# Patient Record
Sex: FEMALE | Race: BLACK OR AFRICAN AMERICAN | Employment: FULL TIME | URBAN - METROPOLITAN AREA
[De-identification: names, ages, dates, MRNs, and addresses within clinical notes are randomized per-mention and may not be internally consistent; named-entity substitution may affect disease eponyms.]

---

## 2017-02-03 ENCOUNTER — OFFICE VISIT (OUTPATIENT)
Dept: SURGERY | Age: 52
End: 2017-02-03

## 2017-02-03 ENCOUNTER — HOSPITAL ENCOUNTER (OUTPATIENT)
Dept: LAB | Age: 52
Discharge: HOME | End: 2017-02-03
Payer: COMMERCIAL

## 2017-02-03 VITALS
DIASTOLIC BLOOD PRESSURE: 85 MMHG | TEMPERATURE: 97.1 F | SYSTOLIC BLOOD PRESSURE: 150 MMHG | HEART RATE: 60 BPM | RESPIRATION RATE: 20 BRPM | HEIGHT: 61 IN | BODY MASS INDEX: 40.78 KG/M2 | WEIGHT: 216 LBS

## 2017-02-03 DIAGNOSIS — K91.2 POSTOPERATIVE MALABSORPTION: Primary | ICD-10-CM

## 2017-02-03 DIAGNOSIS — K90.9 INTESTINAL MALABSORPTION, UNSPECIFIED TYPE: ICD-10-CM

## 2017-02-03 LAB
25(OH)D3 SERPL-MCNC: 35.4 NG/ML (ref 30–100)
ALBUMIN SERPL BCP-MCNC: 3.9 G/DL (ref 3.4–5)
ALBUMIN/GLOB SERPL: 1.1 {RATIO} (ref 0.8–1.7)
ALP SERPL-CCNC: 83 U/L (ref 45–117)
ALT SERPL-CCNC: 27 U/L (ref 13–56)
ANION GAP BLD CALC-SCNC: 8 MMOL/L (ref 3–18)
AST SERPL W P-5'-P-CCNC: 19 U/L (ref 15–37)
BASOPHILS # BLD AUTO: 0 K/UL (ref 0–0.06)
BASOPHILS # BLD: 0 % (ref 0–2)
BILIRUB SERPL-MCNC: 0.4 MG/DL (ref 0.2–1)
BUN SERPL-MCNC: 12 MG/DL (ref 7–18)
BUN/CREAT SERPL: 18 (ref 12–20)
CALCIUM SERPL-MCNC: 9.7 MG/DL (ref 8.5–10.1)
CHLORIDE SERPL-SCNC: 102 MMOL/L (ref 100–108)
CO2 SERPL-SCNC: 31 MMOL/L (ref 21–32)
CREAT SERPL-MCNC: 0.65 MG/DL (ref 0.6–1.3)
DIFFERENTIAL METHOD BLD: ABNORMAL
EOSINOPHIL # BLD: 0.2 K/UL (ref 0–0.4)
EOSINOPHIL NFR BLD: 3 % (ref 0–5)
ERYTHROCYTE [DISTWIDTH] IN BLOOD BY AUTOMATED COUNT: 15.5 % (ref 11.6–14.5)
FERRITIN SERPL-MCNC: 49 NG/ML (ref 8–388)
FOLATE SERPL-MCNC: >20 NG/ML (ref 3.1–17.5)
GLOBULIN SER CALC-MCNC: 3.4 G/DL (ref 2–4)
GLUCOSE SERPL-MCNC: 95 MG/DL (ref 74–99)
HCT VFR BLD AUTO: 38.6 % (ref 35–45)
HGB BLD-MCNC: 12.6 G/DL (ref 12–16)
IRON SERPL-MCNC: 113 UG/DL (ref 50–175)
LYMPHOCYTES # BLD AUTO: 52 % (ref 21–52)
LYMPHOCYTES # BLD: 2.7 K/UL (ref 0.9–3.6)
MCH RBC QN AUTO: 30.4 PG (ref 24–34)
MCHC RBC AUTO-ENTMCNC: 32.6 G/DL (ref 31–37)
MCV RBC AUTO: 93.2 FL (ref 74–97)
MONOCYTES # BLD: 0.3 K/UL (ref 0.05–1.2)
MONOCYTES NFR BLD AUTO: 6 % (ref 3–10)
NEUTS SEG # BLD: 2 K/UL (ref 1.8–8)
NEUTS SEG NFR BLD AUTO: 39 % (ref 40–73)
PLATELET # BLD AUTO: 281 K/UL (ref 135–420)
PMV BLD AUTO: 11.7 FL (ref 9.2–11.8)
POTASSIUM SERPL-SCNC: 4.3 MMOL/L (ref 3.5–5.5)
PROT SERPL-MCNC: 7.3 G/DL (ref 6.4–8.2)
RBC # BLD AUTO: 4.14 M/UL (ref 4.2–5.3)
SODIUM SERPL-SCNC: 141 MMOL/L (ref 136–145)
VIT B12 SERPL-MCNC: 543 PG/ML (ref 211–911)
WBC # BLD AUTO: 5.1 K/UL (ref 4.6–13.2)

## 2017-02-03 PROCEDURE — 36415 COLL VENOUS BLD VENIPUNCTURE: CPT | Performed by: SURGERY

## 2017-02-03 PROCEDURE — 82306 VITAMIN D 25 HYDROXY: CPT | Performed by: SURGERY

## 2017-02-03 PROCEDURE — 84425 ASSAY OF VITAMIN B-1: CPT | Performed by: SURGERY

## 2017-02-03 PROCEDURE — 82728 ASSAY OF FERRITIN: CPT | Performed by: SURGERY

## 2017-02-03 PROCEDURE — 83540 ASSAY OF IRON: CPT | Performed by: SURGERY

## 2017-02-03 PROCEDURE — 80053 COMPREHEN METABOLIC PANEL: CPT | Performed by: SURGERY

## 2017-02-03 PROCEDURE — 85025 COMPLETE CBC W/AUTO DIFF WBC: CPT | Performed by: SURGERY

## 2017-02-03 PROCEDURE — 82607 VITAMIN B-12: CPT | Performed by: SURGERY

## 2017-02-03 RX ORDER — BISMUTH SUBSALICYLATE 262 MG/15ML
30 LIQUID ORAL
COMMUNITY
End: 2019-03-18

## 2017-02-03 RX ORDER — POLYETHYLENE GLYCOL 3350 17 G/17G
17 POWDER, FOR SOLUTION ORAL DAILY
COMMUNITY
End: 2019-03-18

## 2017-02-03 RX ORDER — ATORVASTATIN CALCIUM 10 MG/1
TABLET, FILM COATED ORAL DAILY
COMMUNITY

## 2017-02-03 NOTE — PROGRESS NOTES
46year old female presents for her 3 months post-op bypass apt. Reports that she has some heartburn and constipation has been taking Pepto PRN  and Miramax daily. States she is drinking 78 oz of fluid daily is drinking water and the protein shakes. Is eating fish, shrimp, turkey and chicken. Reports that she is exercising 30 mins daily. Body mass index is 40.81 kg/(m^2).

## 2017-02-03 NOTE — MR AVS SNAPSHOT
Visit Information Date & Time Provider Department Dept. Phone Encounter #  
 2/3/2017  9:30 AM MD [facility] [phone] [id number] Upcoming Health Maintenance Date Due Hepatitis C Screening 1965 DTaP/Tdap/Td series (1 - Tdap) 1/11/1986 PAP AKA CERVICAL CYTOLOGY 1/11/1986 BREAST CANCER SCRN MAMMOGRAM 1/11/2015 FOBT Q 1 YEAR AGE 50-75 1/11/2015 INFLUENZA AGE 9 TO ADULT 8/1/2016 Allergies as of 2/3/2017  Review Complete On: 2/3/2017 By: Claritza Carpio LPN Severity Noted Reaction Type Reactions Shellfish Derived  03/30/2016    Rash Current Immunizations  Never Reviewed No immunizations on file. Not reviewed this visit You Were Diagnosed With   
  
 Codes Comments Postoperative malabsorption    -  Primary ICD-10-CM: K91.2 ICD-9-CM: 579.3 Vitals BP Pulse Temp Resp Height(growth percentile) Weight(growth percentile) 150/85 60 97.1 °F (36.2 °C) (Oral) 20 5' 1\" (1.549 m) 216 lb (98 kg) BMI OB Status Smoking Status 40.81 kg/m2 Hysterectomy Never Smoker Vitals History BMI and BSA Data Body Mass Index Body Surface Area  
 40.81 kg/m 2 2.05 m 2 Preferred Pharmacy Pharmacy Name Phone [facility] [address] [facility], [address] [place] [address] [address] Your Updated Medication List  
  
   
This list is accurate as of: 2/3/17 10:54 AM.  Always use your most recent med list.  
  
  
  
  
 atenolol 25 mg tablet Commonly known as:  TENORMIN Take 75 mg by mouth daily. bismuth subsalicylate 418 CARMELLA/48 mL suspension Commonly known as:  PEPTO-BISMOL Take 30 mL by mouth every six (6) hours as needed for Indigestion. LIPITOR 10 mg tablet Generic drug:  atorvastatin Take  by mouth daily. MIRALAX 17 gram/dose powder Generic drug:  polyethylene glycol Take 17 g by mouth daily. potassium chloride SR 10 mEq tablet Commonly known as:  KLOR-CON 10 Take 10 mEq by mouth daily. Introducing [facility]! Jo-Ann Hendrickson introduces [employer] patient portal. Now you can access parts of your medical record, email your doctor's office, and request medication refills online. 1. In your internet browser, go to [email or web address] 2. Click on the First Time User? Click Here link in the Sign In box. You will see the New Member Sign Up page. 3. Enter your [employer] Access Code exactly as it appears below. You will not need to use this code after youve completed the sign-up process. If you do not sign up before the expiration date, you must request a new code. · [employer] Access Code: [email or web address] Expires: 5/4/2017  9:55 AM 
 
4. Enter the last four digits of your Social Security Number (xxxx) and Date of Birth (mm/dd/yyyy) as indicated and click Submit. You will be taken to the next sign-up page. 5. Create a CloudHashing ID. This will be your [employer]ashing login ID and cannot be changed, so think of one that is secure and easy to remember. 6. Create a [employer] password. You can change your password at any time. 7. Enter your Password Reset Question and Answer. This can be used at a later time if you forget your password. 8. Enter your e-mail address. You will receive e-mail notification when new information is available in [address] 9. Click Sign Up. You can now view and download portions of your medical record. 10. Click the Download Summary menu link to download a portable copy of your medical information. If you have questions, please visit the Frequently Asked Questions section of the [employer] website. Remember, [employer] is NOT to be used for urgent needs. For medical emergencies, dial 911. Now available from your iPhone and Android! Please provide this summary of care documentation to your next provider. Your primary care clinician is listed as [facility]. If you have any questions after today's visit, please call [phone].

## 2017-02-03 NOTE — PROGRESS NOTES
Subjective: Jennifer Maynard is a 46 y.o. female is now 3 months status post laparoscopic gastric bypass surgery. Doing well overall. Currently on a stage 4 diet without difficulty. Taking in 75oz water daily. Sources of protein include supplements, baked chicken, turkey, shrimp. She is walking daily and going to [employer]. 30 min of activity 7 days a week. She has been taken off of all her diabetic medications. Bowel movements are alternating constipation and regularity. She notes that she takes Miralax daily and has 3BM daily. If she doesn't take it, she has one hard BM daily. She feels better on the miralax. The patient is not having any pain. . The patient is compliant with multivitamins, calcium, Vit D and B12 supplements. Weight Loss Metrics 2/3/2017 10/19/2016 10/13/2016 10/13/2016 10/5/2016 9/30/2016 9/16/2016   Pre op / Initial Wt - 273 273 - - - -   Today's Wt 216 lb 254 lb - 261 lb - 276 lb 273 lb   BMI 40.81 kg/m2 47.99 kg/m2 - 49.32 kg/m2 52.15 kg/m2 - 51.58 kg/m2   Ideal Body Wt - 126 122 - - - -   Excess Body Wt - 147 151 - - - -   Goal Wt - 155 150 - - - -   Wt loss to date - 19 12 - - - -   % Wt Loss - 0.16 0.1 - - - -   80% EBW - 117.6 120.8 - - - -       Body mass index is 40.81 kg/(m^2).     Comorbidities:    Hypertension: resolved  Diabetes: resolved  Obstructive Sleep Apnea: not applicable  Hyperlipidemia: unchanged  Stress Urinary Incontinence: improved  Gastroesophageal Reflux: not applicable  Weight related arthropathy:improved        Past Medical History   Diagnosis Date    Diabetes ([address]     Hyperlipidemia     Hyperlipidemia     Hypertension     Morbid obesity ([address]        Past Surgical History   Procedure Laterality Date    Hx gyn  2007     partial hysterectomy    Debridement Right 12/12/2012     incision & debridement of right lower leg wound    Hx hernia repair  2012     incisional & umbilical    Hx hernia repair  2012     VENTRAL WITH MESH Current Outpatient Prescriptions   Medication Sig Dispense Refill    polyethylene glycol (MIRALAX) 17 gram/dose powder Take 17 g by mouth daily.  bismuth subsalicylate (PEPTO-BISMOL) 262 mg/15 mL suspension Take 30 mL by mouth every six (6) hours as needed for Indigestion.  atorvastatin (LIPITOR) 10 mg tablet Take  by mouth daily.  atenolol (TENORMIN) 25 mg tablet Take 75 mg by mouth daily.  Blood-Glucose Meter monitoring kit Check sugars QID 1 Kit 0    insulin regular 100 unit/mL soln injection by SubCUTAneous route Before breakfast, lunch, dinner and at bedtime. If sugars are below 150 do nothing     If glucose is:   150-200 . ... 2 units   200-250 . Garcia Colon .. 4 units   250-300 . Garcia Colon .. 6 units   300-350 . Garcia Colon .. 8 units  350-400 . Garcia Colon .. 10 units  If glucose is above 400, call doctor. 1 Vial 0    potassium chloride SR (KLOR-CON 10) 10 mEq tablet Take 10 mEq by mouth daily. Allergies   Allergen Reactions    Shellfish Derived Rash         Objective:     Visit Vitals    /85    Pulse 60    Temp 97.1 °F (36.2 °C) (Oral)    Resp 20    Ht 5' 1\" (1.549 m)    Wt 98 kg (216 lb)    BMI 40.81 kg/m2       General:  alert, cooperative, no distress, appears stated age   Chest: lungs clear to auscultation, no accessory muscle use   Cor:   Regular rate and rhythm   Abdomen: soft, bowel sounds active, non-tender   Incisions:   healing well, no drainage, no erythema, no hernia, no seroma, no swelling, no dehiscence, incision well approximated       Labs: not done    Assessment:     Doing well postoperatively. Have directed to take Miralax as needed for constipation, but does not need to have 3 Bm daily. She can also decrease her reliance on protein supplements.     Plan:     Increase activity to the goal of 30 minutes daily, Follow up labs as ordered, Increase fluids, Follow up with Registered Dietician and Continue MVI/Ca/B12 supplementation  Encouraged to attend support group  Follow up in 3 months

## 2017-02-07 LAB — VIT B1 BLD-SCNC: 130.4 NMOL/L (ref 66.5–200)

## 2017-11-13 ENCOUNTER — DOCUMENTATION ONLY (OUTPATIENT)
Dept: BARIATRICS/WEIGHT MGMT | Age: 52
End: 2017-11-13

## 2017-11-13 NOTE — PROGRESS NOTES
Per [employer] requirements:  Letter sent requesting follow up appointment. Susan Keyes [address]      Dear Joe Badillo,  Your health is our main concern. It is important for your health to have follow-up lab work and to see you surgeon at 3 months, 6 months and annually after your weight loss surgery. Additionally, the Department of bariatric Surgery at our hospital is a member of the [employer] [address] Surgical Quality Improvement Program ([facility] NSQIP). As a participant in this program, we gather information on the outcomes of our patients after surgery. Please call the office for a follow up appointment at [phone] with NAOMIE Hargrove. If you have moved out of the area or have changed surgeons please call us and let us know the name of your doctor. Your health and feedback are important to us. We greatly appreciate your response.        Thank you,  Susan Hendrickson Loss [address]

## 2018-01-18 ENCOUNTER — TELEPHONE (OUTPATIENT)
Dept: SURGERY | Age: 53
End: 2018-01-18

## 2018-01-18 NOTE — TELEPHONE ENCOUNTER
[facility]; Phone call placed. Spoke with patient. She states that Dr. Ya Majano told her that she does not need to come in for follow up appointments anymore. I encouraged patient to be seen annually. Patient states that when she gets her new insurance she will call to schedule an appointment.

## 2018-09-26 ENCOUNTER — DOCUMENTATION ONLY (OUTPATIENT)
Dept: SURGERY | Age: 53
End: 2018-09-26

## 2018-09-26 NOTE — LETTER
[employer] [facility] Dear Patient, Your health is our main concern. It is important for your health to have follow-up lab work and to see you surgeon at 3 months, 6 months and annually after your weight loss surgery. Additionally, the Department of Bariatric Surgery at our hospital is a member of the [employer] of [address] [place] [address] Surgical Quality Improvement Program ([facility] NSQIP). As a participant in this program, we gather information on the outcomes of our patients after surgery. Please call the office for a follow up appointment at [phone] with DONA Hartley. If you have moved out of the area or have changed surgeons please call us and let us know the name of your doctor. Your health and feedback are important to us. We greatly appreciate your response. Thank you, [facility] Loss [address]

## 2019-01-15 ENCOUNTER — TELEPHONE (OUTPATIENT)
Dept: SURGERY | Age: 54
End: 2019-01-15

## 2019-03-18 ENCOUNTER — OFFICE VISIT (OUTPATIENT)
Dept: SURGERY | Age: 54
End: 2019-03-18

## 2019-03-18 VITALS
HEART RATE: 55 BPM | WEIGHT: 224 LBS | TEMPERATURE: 97.4 F | BODY MASS INDEX: 42.29 KG/M2 | HEIGHT: 61 IN | SYSTOLIC BLOOD PRESSURE: 178 MMHG | RESPIRATION RATE: 20 BRPM | DIASTOLIC BLOOD PRESSURE: 85 MMHG

## 2019-03-18 DIAGNOSIS — K91.2 POSTOPERATIVE MALABSORPTION: Primary | ICD-10-CM

## 2019-03-18 DIAGNOSIS — E55.9 HYPOVITAMINOSIS D: ICD-10-CM

## 2019-03-18 DIAGNOSIS — R10.33 PERIUMBILICAL ABDOMINAL PAIN: ICD-10-CM

## 2019-03-18 RX ORDER — FUROSEMIDE 20 MG/1
TABLET ORAL
COMMUNITY
Start: 2018-09-27

## 2019-03-18 RX ORDER — MUPIROCIN 20 MG/G
OINTMENT TOPICAL
COMMUNITY
Start: 2019-03-06 | End: 2019-03-18

## 2019-03-18 RX ORDER — POLYETHYLENE GLYCOL 3350 17 G/17G
POWDER, FOR SOLUTION ORAL
COMMUNITY
End: 2019-03-18

## 2019-03-18 RX ORDER — OMEPRAZOLE 40 MG/1
40 CAPSULE, DELAYED RELEASE ORAL
COMMUNITY
Start: 2018-09-27 | End: 2019-03-18

## 2019-03-18 RX ORDER — TRIAMCINOLONE ACETONIDE 1 MG/G
CREAM TOPICAL
COMMUNITY
Start: 2016-01-26 | End: 2019-03-18

## 2019-03-18 RX ORDER — ASPIRIN 81 MG/1
81 TABLET ORAL
COMMUNITY
End: 2019-03-18

## 2019-03-18 RX ORDER — CEPHALEXIN 500 MG/1
500 CAPSULE ORAL
COMMUNITY
Start: 2018-12-06 | End: 2019-03-18

## 2019-03-18 NOTE — PATIENT INSTRUCTIONS
If you have any questions or concerns about today's appointment, the verbal and/or written instructions you were given for follow up care, please call our office at [phone].     [facility] - [address], [address] [place] [address]    [phone] office  [phone] fax    CT of Abdomen: Sunday, March 24, 2019 at 3:00pm check in at 2:30pm in [address] at [address] ID, List of Current Medications, Do not Eat or Drink 4 hours prior to scheduled procedure      Get Labs Done at [address]:  Multi-Vitamins, Calcium, B 12

## 2019-03-18 NOTE — PROGRESS NOTES
Allie Laird is a 47 y.o. female who presents today with   Chief Complaint   Patient presents with    Hernia (Non Specific)     Pt c/o abdominal pain, nausea and constipation. She report a buldge in Epigastric region. She is here for surgical intervention. 1. Have you been to the ER, urgent care clinic since your last visit? Hospitalized since your last visit? no    2. Have you seen or consulted any other health care providers outside of the [address] [place] [address] since your last visit? Include any pap smears or colon screening.  no

## 2019-03-18 NOTE — PROGRESS NOTES
Subjective: Sedrick Stevens is a 47 y.o. female is now 2.5 years status post laparoscopic gastric bypass surgery. Doing well overall. Currently on a stage 4 diet without difficulty. Taking in 64oz water,  60g protein. She is sedentary. Bowel movements are constipated. She complains of a new umbilical bulge. She had a lap umbilical and lower midline ventral hernia repair in 2012. .  The patient is not compliant with multivitamins, calcium and B12 supplements. She last saw us 2 years ago. She has gained 25 lbs since then. Weight Loss Metrics 3/18/2019 2/3/2017 10/19/2016 10/13/2016 10/13/2016 10/5/2016 9/30/2016   Pre op / Initial Wt - - 273 273 - - -   Today's Wt 224 lb 216 lb 254 lb - 261 lb - 276 lb   BMI 42.32 kg/m2 40.81 kg/m2 47.99 kg/m2 - 49.32 kg/m2 52.15 kg/m2 -   Ideal Body Wt - - 126 122 - - -   Excess Body Wt - - 147 151 - - -   Goal Wt - - 155 150 - - -   Wt loss to date - - 19 12 - - -   % Wt Loss - - 0.16 0.1 - - -   80% EBW - - 117.6 120.8 - - -       Body mass index is 42.32 kg/m².     Comorbidities:    Hypertension: improved  Diabetes: resolved  Obstructive Sleep Apnea: not applicable  Hyperlipidemia: improved  Stress Urinary Incontinence: not applicable  Gastroesophageal Reflux: not applicable  Weight related arthropathy:improved        Past Medical History:   Diagnosis Date    [facility]     Hyperlipidemia     Hyperlipidemia     Hypertension     Morbid obesity ([facility]        Past Surgical History:   Procedure Laterality Date    DEBRIDEMENT Right 12/12/2012    incision & debridement of right lower leg wound    HX GYN  2007    partial hysterectomy    HX HERNIA REPAIR  2012    incisional & umbilical    HX HERNIA REPAIR  2012    VENTRAL WITH MESH    HX HERNIA REPAIR  2016    Hernia repair with mesh       Current Outpatient Medications   Medication Sig Dispense Refill    furosemide (LASIX) 20 mg tablet TAKE ONE TABLET BY MOUTH ONCE DAILY      atorvastatin (LIPITOR) 10 mg tablet Take  by mouth daily.  atenolol (TENORMIN) 25 mg tablet Take 75 mg by mouth daily. Allergies   Allergen Reactions    Shellfish Derived Rash         Objective:     Visit Vitals  /85 (BP 1 Location: Left arm, BP Patient Position: At rest)   Pulse (!) 55   Temp 97.4 °F (36.3 °C) (Oral)   Resp 20   Ht 5' 1\" (1.549 m)   Wt 101.6 kg (224 lb)   BMI 42.32 kg/m²       General:  alert, cooperative, no distress, appears stated age   Chest: no accessory muscle use   Cor:   Regular rate and rhythm   Abdomen:  multiple lap and lower midline scars. Non-pulsing suprauumbiliical bulgine without reduceable component. Incision:   all fully healed        Labs: none    Assessment:     Doing well postoperatively.   Poor weight loss and compliance    I do not think this is a hernia    Plan:     Increase activity to the goal of 30 minutes daily, Follow up labs as ordered, Increase fluids, Follow up with Registered Dietician and Continue MVI/Ca/B12 supplementation  Follow up in 2 weeks after a CT to look at this bulge and see if there is an actual hernia

## 2019-04-01 ENCOUNTER — OFFICE VISIT (OUTPATIENT)
Dept: SURGERY | Age: 54
End: 2019-04-01

## 2019-04-01 VITALS
DIASTOLIC BLOOD PRESSURE: 74 MMHG | TEMPERATURE: 95.2 F | BODY MASS INDEX: 42.86 KG/M2 | WEIGHT: 227 LBS | HEIGHT: 61 IN | HEART RATE: 60 BPM | RESPIRATION RATE: 20 BRPM | SYSTOLIC BLOOD PRESSURE: 153 MMHG

## 2019-04-01 DIAGNOSIS — K91.2 POSTOPERATIVE MALABSORPTION: Primary | ICD-10-CM

## 2019-04-01 RX ORDER — PANTOPRAZOLE SODIUM 40 MG/1
40 TABLET, DELAYED RELEASE ORAL DAILY
Qty: 90 TAB | Refills: 1 | Status: SHIPPED | OUTPATIENT
Start: 2019-04-01

## 2019-04-01 RX ORDER — SUCRALFATE 1 G/1
1 TABLET ORAL 4 TIMES DAILY
Qty: 120 TAB | Refills: 1 | Status: SHIPPED | OUTPATIENT
Start: 2019-04-01

## 2019-04-01 NOTE — PATIENT INSTRUCTIONS
If you have any questions or concerns about today's appointment, the verbal and/or written instructions you were given for follow up care, please call our office at [phone].     [facility] - [address]    [phone] office  [phone]

## 2019-04-01 NOTE — PROGRESS NOTES
Olivia Gutierrez is a 47 y.o. female who presents today with   Chief Complaint   Patient presents with    Hernia (Non Specific)     Pt presents today for evaluation of hernia. CT ABD with contrast 3/25/2019                1. Have you been to the ER, urgent care clinic since your last visit? Hospitalized since your last visit? No    2. Have you seen or consulted any other health care providers outside of the [address] [place] [address] since your last visit? Include any pap smears or colon screening.  No

## 2019-04-09 ENCOUNTER — DOCUMENTATION ONLY (OUTPATIENT)
Dept: SURGERY | Age: 54
End: 2019-04-09

## 2019-04-09 NOTE — PROGRESS NOTES
Subjective: Nikita Jacmkan is a 47 y.o. female is now 2.5 years status post laparoscopic gastric bypass surgery with Dr. Mikayla Mello. She returns today after CT obtained to look for hernia at possible periumbilical position. She complains of pain with eating fruit and starches. Weight Loss Metrics 4/1/2019 3/18/2019 2/3/2017 10/19/2016 10/13/2016 10/13/2016 10/5/2016   Pre op / Initial Wt - - - 273 273 - -   Today's Wt 227 lb 224 lb 216 lb 254 lb - 261 lb -   BMI 42.89 kg/m2 42.32 kg/m2 40.81 kg/m2 47.99 kg/m2 - 49.32 kg/m2 52.15 kg/m2   Ideal Body Wt - - - 126 122 - -   Excess Body Wt - - - 147 151 - -   Goal Wt - - - 155 150 - -   Wt loss to date - - - 19 12 - -   % Wt Loss - - - 0.16 0.1 - -   80% EBW - - - 117.6 120.8 - -       Body mass index is 42.89 kg/m². Comorbidities:    Hypertension: improved  Diabetes: improved  Obstructive Sleep Apnea: not applicable  Hyperlipidemia: improved  Stress Urinary Incontinence: not applicable  Gastroesophageal Reflux: not applicable  Weight related arthropathy:not applicable        Past Medical History:   Diagnosis Date    [facility]     Hyperlipidemia     Hyperlipidemia     Hypertension     Morbid obesity ([facility]        Past Surgical History:   Procedure Laterality Date    DEBRIDEMENT Right 12/12/2012    incision & debridement of right lower leg wound    HX GYN  2007    partial hysterectomy    HX HERNIA REPAIR  2012    incisional & umbilical    HX HERNIA REPAIR  2012    VENTRAL WITH MESH    HX HERNIA REPAIR  2016    Hernia repair with mesh       Current Outpatient Medications   Medication Sig Dispense Refill    pantoprazole (PROTONIX) 40 mg tablet Take 1 Tab by mouth daily. 90 Tab 1    sucralfate (CARAFATE) 1 gram tablet Take 1 Tab by mouth four (4) times daily. Indications: stomach ulcer 120 Tab 1    furosemide (LASIX) 20 mg tablet TAKE ONE TABLET BY MOUTH ONCE DAILY      atorvastatin (LIPITOR) 10 mg tablet Take  by mouth daily.       atenolol (TENORMIN) 25 mg tablet Take 75 mg by mouth daily. Allergies   Allergen Reactions    Shellfish Derived Rash         Objective:     Visit Vitals  /74 (BP 1 Location: Left arm, BP Patient Position: At rest)   Pulse 60   Temp 95.2 °F (35.1 °C) (Oral)   Resp 20   Ht 5' 1\" (1.549 m)   Wt 103 kg (227 lb)   BMI 42.89 kg/m²       General:  alert, cooperative, no distress, appears stated age   Chest: no accessory muscle use   Cor:   Regular rate and rhythm   Abdomen: soft, bowel sounds active, non-tender, no hernias   Incision:   healed       CT - at [facility] -   No discrete CT abnormality identified to explain reported symptoms of palpable abdominal wall mass. Evidence of prior hernia mesh repair which appears intact/unchanged. Assessment:     No hernia - symptoms are best explained by dietary indiscretions. Explained again that her starch and sugar consumption will cause these symptoms.      Plan:     Follow up with Registered Dietician for dietary education again  Follow up in 1 years

## 2019-09-13 ENCOUNTER — DOCUMENTATION ONLY (OUTPATIENT)
Dept: SURGERY | Age: 54
End: 2019-09-13

## 2019-09-13 NOTE — PROGRESS NOTES
Per [facility] requirements;  E-mail and letter sent for follow up appointment. [facility] [place]   [facility]  [facility]        Dear Patient,        Your health is our main concern. It is important for your health to have follow-up lab work and to see you surgeon at 3 months, 6 months and annually after your weight loss surgery. Additionally, the Department of Bariatric Surgery at our hospital is a member of the [employer] [address] Surgical Quality Improvement Program ([facility] NSQIP). As a participant in this program, we gather information on the outcomes of our patients after surgery. Please call the office for a follow up appointment at [phone]. If you have moved out of the area or have changed surgeons please call us and let us know the name of your doctor. Your health and feedback are important to us. We greatly appreciate your response.        Thank you,  [facility] [address] [facility]

## 2019-09-13 NOTE — LETTER
[employer] [facility] Loss [facility]/HOSPITAL [facility] Dear Patient, Your health is our main concern. It is important for your health to have follow-up lab work and to see you surgeon at 3 months, 6 months and annually after your weight loss surgery. Additionally, the Department of Bariatric Surgery at our hospital is a member of the [employer] of [address] [place] [address] Surgical Quality Improvement Program ([facility] NSQIP). As a participant in this program, we gather information on the outcomes of our patients after surgery. Please call the office for a follow up appointment at [phone]. If you have moved out of the area or have changed surgeons please call us and let us know the name of your doctor. Your health and feedback are important to us. We greatly appreciate your response. Thank you, [facility] [place]

## 2025-07-21 DIAGNOSIS — K91.2 POSTOPERATIVE MALABSORPTION: Primary | ICD-10-CM

## 2025-08-05 ENCOUNTER — TELEPHONE (OUTPATIENT)
Age: 60
End: 2025-08-05

## 2025-08-05 LAB
A/G RATIO: 1.1 RATIO (ref 1.1–2.6)
ALBUMIN: 4.1 G/DL (ref 3.5–5)
ALP BLD-CCNC: 174 U/L (ref 40–120)
ALT SERPL-CCNC: 9 U/L (ref 5–40)
ANION GAP SERPL CALCULATED.3IONS-SCNC: 16 MMOL/L (ref 3–15)
AST SERPL-CCNC: 20 U/L (ref 10–37)
BASOPHILS # BLD: 1 % (ref 0–2)
BASOPHILS ABSOLUTE: 0 K/UL (ref 0–0.2)
BILIRUB SERPL-MCNC: 0.2 MG/DL (ref 0.2–1.2)
BUN BLDV-MCNC: 9 MG/DL (ref 6–22)
CALCIUM SERPL-MCNC: 9.4 MG/DL (ref 8.4–10.5)
CHLORIDE BLD-SCNC: 103 MMOL/L (ref 98–110)
CO2: 25 MMOL/L (ref 20–32)
CREAT SERPL-MCNC: 0.6 MG/DL (ref 0.8–1.4)
EOSINOPHIL # BLD: 3 % (ref 0–6)
EOSINOPHILS ABSOLUTE: 0.2 K/UL (ref 0–0.5)
FERRITIN: 10 NG/ML (ref 10–291)
FOLATE: 14.5 NG/ML
GFR, ESTIMATED: >90 ML/MIN/1.73 SQ.M.
GLOBULIN: 3.7 G/DL (ref 2–4)
GLUCOSE: 74 MG/DL (ref 70–99)
HCT VFR BLD CALC: 35.3 % (ref 35.1–48)
HEMOGLOBIN: 10.3 G/DL (ref 11.7–16)
LYMPHOCYTES # BLD: 27 % (ref 20–45)
LYMPHOCYTES ABSOLUTE: 1.8 K/UL (ref 1–4.8)
MCH RBC QN AUTO: 25 PG (ref 26–34)
MCHC RBC AUTO-ENTMCNC: 29 G/DL (ref 31–36)
MCV RBC AUTO: 86 FL (ref 80–99)
MONOCYTES ABSOLUTE: 0.3 K/UL (ref 0.1–1)
MONOCYTES: 5 % (ref 3–12)
NEUTROPHILS ABSOLUTE: 4.1 K/UL (ref 1.8–7.7)
NEUTROPHILS SEGMENTED: 65 % (ref 40–75)
PDW BLD-RTO: 17.1 % (ref 10–15.5)
PLATELET # BLD: 466 K/UL (ref 140–440)
PMV BLD AUTO: 10.2 FL (ref 9–13)
POTASSIUM SERPL-SCNC: 4 MMOL/L (ref 3.5–5.5)
RBC # BLD: 4.11 M/UL (ref 3.8–5.2)
SODIUM BLD-SCNC: 144 MMOL/L (ref 133–145)
TOTAL PROTEIN: 7.8 G/DL (ref 6.2–8.1)
VITAMIN B-12: 593 PG/ML (ref 211–911)
VITAMIN D 25-HYDROXY: 61.9 NG/ML (ref 32–100)
WBC # BLD: 6.4 K/UL (ref 4–11)

## 2025-08-06 LAB — IRON: 34 MCG/DL (ref 30–160)

## 2025-08-11 LAB — THIAMINE BLOOD: 115 NMOL/L (ref 78–185)
